# Patient Record
Sex: FEMALE | Race: WHITE | NOT HISPANIC OR LATINO | Employment: UNEMPLOYED | URBAN - METROPOLITAN AREA
[De-identification: names, ages, dates, MRNs, and addresses within clinical notes are randomized per-mention and may not be internally consistent; named-entity substitution may affect disease eponyms.]

---

## 2019-11-14 ENCOUNTER — HOSPITAL ENCOUNTER (OUTPATIENT)
Dept: SURGERY | Facility: CLINIC | Age: 24
Discharge: HOME | End: 2019-11-14
Attending: UROLOGY

## 2019-11-14 ENCOUNTER — CONVERSION ENCOUNTER (OUTPATIENT)
Dept: OTHER | Facility: HOSPITAL | Age: 24
End: 2019-11-14

## 2019-11-14 ENCOUNTER — OFFICE VISIT CONVERTED (OUTPATIENT)
Dept: UROLOGY | Facility: CLINIC | Age: 24
End: 2019-11-14
Attending: UROLOGY

## 2019-11-17 LAB — BACTERIA UR CULT: NORMAL

## 2019-11-21 ENCOUNTER — HOSPITAL ENCOUNTER (OUTPATIENT)
Dept: PERIOP | Facility: HOSPITAL | Age: 24
Setting detail: HOSPITAL OUTPATIENT SURGERY
Discharge: HOME | End: 2019-11-21
Attending: UROLOGY

## 2019-11-21 LAB — HCG UR QL: NEGATIVE

## 2019-11-27 ENCOUNTER — OFFICE VISIT CONVERTED (OUTPATIENT)
Dept: UROLOGY | Facility: CLINIC | Age: 24
End: 2019-11-27
Attending: UROLOGY

## 2019-12-11 ENCOUNTER — OFFICE VISIT CONVERTED (OUTPATIENT)
Dept: UROLOGY | Facility: CLINIC | Age: 24
End: 2019-12-11
Attending: UROLOGY

## 2019-12-11 ENCOUNTER — CONVERSION ENCOUNTER (OUTPATIENT)
Dept: SURGERY | Facility: CLINIC | Age: 24
End: 2019-12-11

## 2019-12-11 ENCOUNTER — HOSPITAL ENCOUNTER (OUTPATIENT)
Dept: SURGERY | Facility: CLINIC | Age: 24
Discharge: HOME | End: 2019-12-11
Attending: UROLOGY

## 2019-12-13 LAB — BACTERIA UR CULT: NORMAL

## 2020-01-08 ENCOUNTER — OFFICE VISIT CONVERTED (OUTPATIENT)
Dept: UROLOGY | Facility: CLINIC | Age: 25
End: 2020-01-08
Attending: UROLOGY

## 2020-01-08 ENCOUNTER — CONVERSION ENCOUNTER (OUTPATIENT)
Dept: SURGERY | Facility: CLINIC | Age: 25
End: 2020-01-08

## 2020-04-07 ENCOUNTER — CONVERSION ENCOUNTER (OUTPATIENT)
Dept: OTHER | Facility: HOSPITAL | Age: 25
End: 2020-04-07

## 2020-04-07 ENCOUNTER — TELEMEDICINE CONVERTED (OUTPATIENT)
Dept: GASTROENTEROLOGY | Facility: CLINIC | Age: 25
End: 2020-04-07
Attending: NURSE PRACTITIONER

## 2021-05-12 NOTE — PROGRESS NOTES
"   Quick Note      Patient Name: Maia Saldana   Patient ID: [id number]   Sex: Female   YOB: 1995    Primary Care Provider: Kiana RODRIGUES   Referring Provider: Gerard Talamantes MD    Visit Date: April 7, 2020    Provider: LILIA Villatoro   Location: [facility]   Location Address: [address] [place] [address], [address] [place] [address]  [id number]   Location Phone: [phone]          History Of Present Illness  TELEHEALTH VISIT  Chief Complaint: Diarrhea   Maia Saldana is a 24 year old /White female who is presenting for evaluation via telehealth visit. Verbal consent obtained before beginning visit.   Provider spent 22 minutes with the patient during telehealth visit.   The following staff were present during this visit: Gail RODRIGUES; Karishma Montes De Oca   Past Medical History/Overview of Patient Symptoms     Patient sates she has had digestive issues for many years now. Having a bowel movement multiple times a day, loose stool, with fecal urgency. Stool has a \"mucous\" appearance. Notes intense abdominal pain before having a bowel movement. Eating makes fecal urgency worse. Denies hematochezia and melena. She does states that she was recently diagnosed with alpha gal 2 weeks ago and has been avoiding all beef and diary products. Patient states since avoiding these types of foods she is not having any abdominal pain but still having loose stool.  Denies family history of colon cancer.    FMH: Brother colitis    PMH: Reports she has  gene HLA-B 27    CBC 3/17/2020: WBC 5.3, hemoglobin 13.1, hematocrit 39.9, platelets 364.  CMP 3/17/2020: , total bilirubin 0.5, alkaline phosphatase 68, AST 17, ALT 16.  BAUDILIO 3/17/2020: Negative  Alpha gal panel 3/17/2020: Alpha gal IgE 0.16 (H)  Iron profile 3/17/2020: Iron-binding capacity 426, iron 50, iron saturation 12%, UIBC 376.       Vitals  Date Time BP Position Site L\R Cuff Size HR RR TEMP (F) WT  HT  BMI kg/m2 BSA " "m2 O2 Sat        04/07/2020 11:00 AM         124lbs 0oz 5'  3.5\" 21.62 1.59               Assessment  · Diarrhea     787.91/R19.7    Problems Reconciled  Plan  · Orders  o Physician Telephone Evaluation, 21-30 minutes (66825) - - 04/07/2020  o C difficile Toxigenic Assay (PCR) [facility] ([id number]) - - 04/07/2020  o Stool culture.. ([facility]) - - 04/07/2020  o Giardia and Cryptosporidium Enzyme Immunoassay [facility] ([id number], 77912) - - 04/07/2020  o Lactoferrin (Fecal) Qualitative (36346) - - 04/07/2020  · Medications  o Medications have been Reconciled  o Transition of Care or Provider Policy  · Instructions  o Plan Of Care:   o Patient instructed to seek medical attention urgently for new or worsening symptoms.  o Call the office with any concerns or questions.  o Consider possible colonoscopy next based on stool study findings.  o Labs showed a low iron saturation. Patient states she is unaware of having a low level. Continue to monitor and consider EGD and colonoscopy after restrictions have been lifted for EGD and colonoscopies.  o Electronically Identified Patient Education Materials Provided Electronically  · Disposition  o 3 month f/u            Electronically Signed by: LILIA Villatoro -Author on April 7, 2020 01:02:32 PM  "

## 2021-05-15 VITALS
WEIGHT: 128.25 LBS | SYSTOLIC BLOOD PRESSURE: 128 MMHG | DIASTOLIC BLOOD PRESSURE: 66 MMHG | BODY MASS INDEX: 21.89 KG/M2 | HEIGHT: 64 IN

## 2021-05-15 VITALS — BODY MASS INDEX: 21.97 KG/M2 | HEIGHT: 63 IN | WEIGHT: 124 LBS

## 2021-05-15 VITALS
HEIGHT: 64 IN | BODY MASS INDEX: 22.2 KG/M2 | SYSTOLIC BLOOD PRESSURE: 127 MMHG | WEIGHT: 130 LBS | DIASTOLIC BLOOD PRESSURE: 77 MMHG

## 2021-05-15 VITALS
HEIGHT: 64 IN | SYSTOLIC BLOOD PRESSURE: 131 MMHG | BODY MASS INDEX: 22.2 KG/M2 | DIASTOLIC BLOOD PRESSURE: 68 MMHG | WEIGHT: 130 LBS

## 2021-05-15 VITALS
WEIGHT: 131 LBS | BODY MASS INDEX: 22.36 KG/M2 | HEIGHT: 64 IN | DIASTOLIC BLOOD PRESSURE: 76 MMHG | SYSTOLIC BLOOD PRESSURE: 138 MMHG

## 2022-07-27 ENCOUNTER — TELEPHONE (OUTPATIENT)
Dept: GASTROENTEROLOGY | Facility: CLINIC | Age: 27
End: 2022-07-27

## 2022-07-27 NOTE — TELEPHONE ENCOUNTER
PCP office left a voice message yesterday evening wanting to see if you could see the patient sooner than November. She has been in their office twice in 2 weeks for abd pain.

## 2022-12-14 ENCOUNTER — TELEPHONE (OUTPATIENT)
Dept: GASTROENTEROLOGY | Facility: CLINIC | Age: 27
End: 2022-12-14

## 2022-12-14 NOTE — TELEPHONE ENCOUNTER
Called patient and left a message about their appointment with Gail Blakely as she will no longer be seeing patients on original appointment date.  Left a message for a call back.

## 2022-12-16 NOTE — TELEPHONE ENCOUNTER
Called patient for the second time and left a message about their appointment with Gail Blakely as she will no longer be seeing patients on original appointment date.  Left a message for a call back. A letter has been mailed informing the patient that we are needing to reschedule their appointment.

## 2022-12-27 NOTE — TELEPHONE ENCOUNTER
Patient has not contacted the office back, the appointment has been cancelled and a letter has been mailed to the patient with more information.